# Patient Record
(demographics unavailable — no encounter records)

---

## 2024-12-06 NOTE — HISTORY OF PRESENT ILLNESS
[FreeTextEntry1] : The patient is here for a routine visit.  [de-identified] : She exercises three days per week.  No chest pain.  Her diet is fair and she has gained a few pounds.    She has B/L R>L hand and arm tingling, numbness for a few years, not getting worse.  It doesn't limit her.  She also feels a slight tremor.  No redness or swelling.

## 2024-12-06 NOTE — ASSESSMENT
[FreeTextEntry1] : The BP is difficult to hear but may be a little higher at 140-145/80.  It has previously been ok.  She has gained weight since the last visit.  Diet, exercise, weight loss, limit salt discussed.  Check next time.  Check lipids on Atorvastatin and Zetia.  Check a HgBA1c.  Monitor TFTs.  She has had the flu vaccine and new COVID-19 vaccine.  She says she saw her gyn and had follow-up imaging of the ovarian lesion and she says she was told it was ok.  She was advised to have the report sent and she agrees.  She says she had a recent mammogram and she will have that sent.    ASA 81 mg QD again advised- she hasn't been taking it.  She has B/L R>L UE tingling, numbness.  It might be neurological.  We discussed a neurology evaluation but symptoms are chronic, stable and fairly mild and she wants to hold off for now.  It it gets worse, she will see a neurologist.

## 2025-05-09 NOTE — ASSESSMENT
[FreeTextEntry1] : The BP is difficult to hear but is probably 135/80.  Continue current meds.  Check a HgBA1c.  Check lipids on the statin and Zetia.  Discussed diet and exercise.  Check TFTs- TSH was slightly elevated.  She has seen her gyn.  She has a dry mouth and dry eyes.  She sees her ophthalmologist.  Check Sjogren's antibodies to be thorough.

## 2025-05-09 NOTE — HISTORY OF PRESENT ILLNESS
[FreeTextEntry1] : The patient is here for a routine visit.  [de-identified] : She feels well.  She is exercising more.  Diet is healthy.  She has a dry mouth and dry lips.  She also sees an ophthalmologist for dry eyes.